# Patient Record
Sex: FEMALE | Race: WHITE | URBAN - METROPOLITAN AREA
[De-identification: names, ages, dates, MRNs, and addresses within clinical notes are randomized per-mention and may not be internally consistent; named-entity substitution may affect disease eponyms.]

---

## 2024-02-16 ENCOUNTER — OFFICE VISIT (OUTPATIENT)
Facility: CLINIC | Age: 59
End: 2024-02-16

## 2024-02-16 DIAGNOSIS — I61.9 CVA (CEREBROVASCULAR ACCIDENT DUE TO INTRACEREBRAL HEMORRHAGE) (HCC): Primary | ICD-10-CM

## 2024-02-16 DIAGNOSIS — F03.93 DEMENTIA WITH MOOD DISTURBANCE, UNSPECIFIED DEMENTIA SEVERITY, UNSPECIFIED DEMENTIA TYPE (HCC): ICD-10-CM

## 2024-02-16 DIAGNOSIS — H57.12 DISCOMFORT OF LEFT EYE: ICD-10-CM

## 2024-02-17 NOTE — PROGRESS NOTES
PLACE OF SERVICE:  [facility] & [place] [address], [place] [address]     SNF/LTC visit     2/16/2024    Chief Complaint :   Left eye irritation / Discomfort     Dementia  CVA  Bipolar disorder    HPI : Patient is a 57-year-old female with significant past medical history of intracranial bleed/subdural hematoma status postcraniotomy, right-sided hemiplegia, wheelchair to bedbound, bipolar disorder and significant cognitive decline due to vascular dementia seen for follow-up and long-term recertification as above. Today c/o constant left eye irritation and discomfort , Denies headache dizziness. Denies blurring of vision.no fever or chills , no fall trauma     ROS :  Cardiac Negative pulmonary negative    Past medical history:  Intracranial bleeding, subdural hematoma, CVA, right-sided hemiplegia, depression,  dementia, traumatic brain injury, bipolar disorder with psychosis, generalized anxiety  disorder, alcohol abuse, diabetes, hypertension, hypothyroidism, hyperlipidemia.    Past surgical history:  Craniotomy for subdural hematoma, cervical/neck surgery      Medication:  Reviewed all meds in EMR  Family history:  Noncontributory  Psychosocial history:  Prior to coming to this facility patient was living in assisted living facility, reports of  alcohol abuse no reports of smoking illicit drug use.    Allergies : Aspirin  Penicillins  PYRAZOLES      Physical Exam  BP: 124/78 mmHgTemp:97.8 °FPulse:93 bpmResp:18 Breaths/min    Exam : Constitutional: No acute distress; cooperative but significantly impaired cognitively.  Eyes: Sclera clear, PEERL, Left eye dry , no erythema or tenderness , continues to rub   Ears/Nose/Mouth/Throat: mmm, OP clear, trachea midline;  Cardiovascular: RRR, nml S1 and S2, no rubs, murmurs or gallops, no edema, no  cyanosis;  Respiratory: Clear to auscultation, symmetric, No respiratory distress;  Gastrointestinal: Abdomen soft, NT, ND, no masses, normal bowel

## 2024-02-22 ENCOUNTER — OFFICE VISIT (OUTPATIENT)
Facility: CLINIC | Age: 59
End: 2024-02-22
Payer: MEDICAID

## 2024-02-22 DIAGNOSIS — R52 PAIN: Primary | ICD-10-CM

## 2024-02-22 DIAGNOSIS — F03.93 DEMENTIA WITH MOOD DISTURBANCE, UNSPECIFIED DEMENTIA SEVERITY, UNSPECIFIED DEMENTIA TYPE (HCC): ICD-10-CM

## 2024-02-22 DIAGNOSIS — I61.9 CVA (CEREBROVASCULAR ACCIDENT DUE TO INTRACEREBRAL HEMORRHAGE) (HCC): ICD-10-CM

## 2024-02-22 PROCEDURE — 99309 SBSQ NF CARE MODERATE MDM 30: CPT | Performed by: INTERNAL MEDICINE

## 2024-02-22 NOTE — PROGRESS NOTES
PLACE OF SERVICE:  [facility] & [place] [address], [place] [address]         SNF/LTC visit     2/16/2024    Chief Complaint :   Right Shoulder pains   Tramadol refill     Dementia  CVA  Bipolar disorder    HPI : Patient is a 57-year-old female with significant past medical history of intracranial bleed/subdural hematoma status post craniotomy, right-sided hemiplegia, wheelchair to bed bound, bipolar disorder and significant cognitive decline due to vascular dementia seen for on going right shoulder pain and pain meds review ,  She also c/o baseline generalized body pain more prominent on right side , she does respond to current pain meds .   ROS :  Cardiac Negative pulmonary negative    Past medical history:  Intracranial bleeding, subdural hematoma, CVA, right-sided hemiplegia, depression,  dementia, traumatic brain injury, bipolar disorder with psychosis, generalized anxiety  disorder, alcohol abuse, diabetes, hypertension, hypothyroidism, hyperlipidemia.    Past surgical history:  Craniotomy for subdural hematoma, cervical/neck surgery      Medication:  Reviewed all meds in EMR  Family history:  Noncontributory  Psychosocial history:  Prior to coming to this facility patient was living in assisted living facility, reports of  alcohol abuse no reports of smoking illicit drug use.    Allergies : Aspirin  Penicillins  PYRAZOLES      Physical Exam  BP: 120/70 mmHgTemp:97.8 °F Pulse:90bpmResp:18 Breaths/min    Exam : Constitutional: No acute distress; cooperative but significantly impaired cognitively.  Eyes: Sclera clear, PEERL, Left eye dry , no erythema or tenderness , continues to rub   Ears/Nose/Mouth/Throat: mmm, OP clear, trachea midline;  Cardiovascular: RRR, nml S1 and S2, no rubs, murmurs or gallops, no edema, no  cyanosis;  Respiratory: Clear to auscultation, symmetric, No respiratory distress;  Gastrointestinal: Abdomen soft, NT, ND, no masses, normal bowel sounds;  Neurological: